# Patient Record
Sex: MALE | Race: WHITE | URBAN - METROPOLITAN AREA
[De-identification: names, ages, dates, MRNs, and addresses within clinical notes are randomized per-mention and may not be internally consistent; named-entity substitution may affect disease eponyms.]

---

## 2024-09-16 ENCOUNTER — TELEPHONE (OUTPATIENT)
Dept: HEALTH INFORMATION MANAGEMENT | Facility: OTHER | Age: 59
End: 2024-09-16

## 2025-04-05 ENCOUNTER — APPOINTMENT (OUTPATIENT)
Dept: RADIOLOGY | Facility: MEDICAL CENTER | Age: 60
End: 2025-04-05
Attending: STUDENT IN AN ORGANIZED HEALTH CARE EDUCATION/TRAINING PROGRAM
Payer: COMMERCIAL

## 2025-06-06 ENCOUNTER — APPOINTMENT (OUTPATIENT)
Dept: MEDICAL GROUP | Facility: MEDICAL CENTER | Age: 60
End: 2025-06-06
Payer: COMMERCIAL

## 2025-06-06 VITALS
BODY MASS INDEX: 35.36 KG/M2 | SYSTOLIC BLOOD PRESSURE: 110 MMHG | TEMPERATURE: 98.1 F | DIASTOLIC BLOOD PRESSURE: 66 MMHG | OXYGEN SATURATION: 95 % | HEART RATE: 69 BPM | WEIGHT: 247 LBS | HEIGHT: 70 IN

## 2025-06-06 DIAGNOSIS — Z00.00 ENCOUNTER FOR MEDICAL EXAMINATION TO ESTABLISH CARE: Primary | ICD-10-CM

## 2025-06-06 DIAGNOSIS — I83.023 VARICOSE VEINS OF LEFT LOWER EXTREMITY WITH ULCER OF ANKLE, UNSPECIFIED ULCER STAGE (HCC): ICD-10-CM

## 2025-06-06 DIAGNOSIS — Z13.29 THYROID DISORDER SCREENING: ICD-10-CM

## 2025-06-06 DIAGNOSIS — E78.5 HYPERLIPIDEMIA, UNSPECIFIED HYPERLIPIDEMIA TYPE: ICD-10-CM

## 2025-06-06 DIAGNOSIS — L97.329 VARICOSE VEINS OF LEFT LOWER EXTREMITY WITH ULCER OF ANKLE, UNSPECIFIED ULCER STAGE (HCC): ICD-10-CM

## 2025-06-06 DIAGNOSIS — Z79.01 LONG TERM CURRENT USE OF ANTICOAGULANT THERAPY: ICD-10-CM

## 2025-06-06 DIAGNOSIS — Z13.1 SCREENING FOR DIABETES MELLITUS: ICD-10-CM

## 2025-06-06 DIAGNOSIS — G89.29 CHRONIC NONINTRACTABLE HEADACHE, UNSPECIFIED HEADACHE TYPE: ICD-10-CM

## 2025-06-06 DIAGNOSIS — F51.01 PRIMARY INSOMNIA: ICD-10-CM

## 2025-06-06 DIAGNOSIS — I87.2 VENOUS INSUFFICIENCY: ICD-10-CM

## 2025-06-06 DIAGNOSIS — Z86.718 HISTORY OF THROMBOEMBOLISM OF VEIN: ICD-10-CM

## 2025-06-06 DIAGNOSIS — Z12.11 COLON CANCER SCREENING: ICD-10-CM

## 2025-06-06 DIAGNOSIS — R51.9 CHRONIC NONINTRACTABLE HEADACHE, UNSPECIFIED HEADACHE TYPE: ICD-10-CM

## 2025-06-06 DIAGNOSIS — R79.89 LOW TESTOSTERONE: ICD-10-CM

## 2025-06-06 DIAGNOSIS — Z86.711 HISTORY OF PULMONARY EMBOLISM: ICD-10-CM

## 2025-06-06 PROBLEM — I50.810 RIGHT VENTRICULAR FAILURE (HCC): Status: ACTIVE | Noted: 2025-06-06

## 2025-06-06 PROBLEM — L97.321: Status: ACTIVE | Noted: 2025-06-06

## 2025-06-06 PROBLEM — N52.9 ERECTILE DYSFUNCTION: Status: ACTIVE | Noted: 2025-06-06

## 2025-06-06 PROBLEM — K59.00 CONSTIPATION: Status: ACTIVE | Noted: 2025-06-06

## 2025-06-06 PROBLEM — F32.9 MAJOR DEPRESSIVE DISORDER, SINGLE EPISODE, UNSPECIFIED: Status: ACTIVE | Noted: 2025-06-06

## 2025-06-06 PROBLEM — F32.9 MAJOR DEPRESSIVE DISORDER, SINGLE EPISODE, UNSPECIFIED: Status: RESOLVED | Noted: 2025-06-06 | Resolved: 2025-06-06

## 2025-06-06 PROBLEM — I50.810 RIGHT VENTRICULAR FAILURE (HCC): Status: RESOLVED | Noted: 2025-06-06 | Resolved: 2025-06-06

## 2025-06-06 PROCEDURE — 3074F SYST BP LT 130 MM HG: CPT | Performed by: FAMILY MEDICINE

## 2025-06-06 PROCEDURE — 3078F DIAST BP <80 MM HG: CPT | Performed by: FAMILY MEDICINE

## 2025-06-06 PROCEDURE — 99204 OFFICE O/P NEW MOD 45 MIN: CPT | Performed by: FAMILY MEDICINE

## 2025-06-06 RX ORDER — WARFARIN SODIUM 10 MG/1
10 TABLET ORAL DAILY
Qty: 30 TABLET | Refills: 3 | Status: SHIPPED | OUTPATIENT
Start: 2025-06-06

## 2025-06-06 RX ORDER — ROSUVASTATIN CALCIUM 5 MG/1
5 TABLET, COATED ORAL DAILY
Qty: 100 TABLET | Refills: 3 | Status: SHIPPED | OUTPATIENT
Start: 2025-06-06

## 2025-06-06 RX ORDER — ENOXAPARIN SODIUM 100 MG/ML
30 INJECTION SUBCUTANEOUS EVERY 12 HOURS
Qty: 3.6 ML | Refills: 0 | Status: SHIPPED | OUTPATIENT
Start: 2025-06-06

## 2025-06-06 RX ORDER — ORPHENADRINE CITRATE 100 MG/1
1 TABLET ORAL
COMMUNITY
Start: 2025-03-18 | End: 2025-06-06

## 2025-06-06 RX ORDER — DULOXETIN HYDROCHLORIDE 30 MG/1
CAPSULE, DELAYED RELEASE ORAL
COMMUNITY
Start: 2025-04-15 | End: 2025-06-06

## 2025-06-06 RX ORDER — TRAZODONE HYDROCHLORIDE 100 MG/1
100 TABLET ORAL NIGHTLY
Qty: 90 TABLET | Refills: 3 | Status: SHIPPED | OUTPATIENT
Start: 2025-06-06

## 2025-06-06 ASSESSMENT — PATIENT HEALTH QUESTIONNAIRE - PHQ9
2. FEELING DOWN, DEPRESSED, IRRITABLE, OR HOPELESS: NOT AT ALL
SUM OF ALL RESPONSES TO PHQ9 QUESTIONS 1 AND 2: 0
7. TROUBLE CONCENTRATING ON THINGS, SUCH AS READING THE NEWSPAPER OR WATCHING TELEVISION: NOT AT ALL
1. LITTLE INTEREST OR PLEASURE IN DOING THINGS: NOT AT ALL
5. POOR APPETITE OR OVEREATING: NOT AT ALL
3. TROUBLE FALLING OR STAYING ASLEEP OR SLEEPING TOO MUCH: NOT AT ALL
8. MOVING OR SPEAKING SO SLOWLY THAT OTHER PEOPLE COULD HAVE NOTICED. OR THE OPPOSITE, BEING SO FIGETY OR RESTLESS THAT YOU HAVE BEEN MOVING AROUND A LOT MORE THAN USUAL: NOT AT ALL
SUM OF ALL RESPONSES TO PHQ QUESTIONS 1-9: 0
4. FEELING TIRED OR HAVING LITTLE ENERGY: NOT AT ALL
9. THOUGHTS THAT YOU WOULD BE BETTER OFF DEAD, OR OF HURTING YOURSELF: NOT AT ALL
6. FEELING BAD ABOUT YOURSELF - OR THAT YOU ARE A FAILURE OR HAVE LET YOURSELF OR YOUR FAMILY DOWN: NOT AL ALL

## 2025-06-06 NOTE — PROGRESS NOTES
Verbal consent was acquired by the patient to use Might[employer] ambient listening note generation during this visit: Yes      HPI:    Davis Yoo is a pleasant 59 y.o. male here to establish care.    History of Present Illness  The patient is a 59-year-old individual here to establish care.    Severe shoulder arthritis  - Diagnosed with severe shoulder arthritis, characterized by near bone-on-bone contact  - Deferred surgical intervention due to personal circumstances  - Manages significant pain without surgery    Colon cancer screening  - Prefers Cologuard over colonoscopy for colon cancer screening  - Received COVID-19 vaccine    Dental extraction assistance  - Seeking assistance with paperwork for dental extraction  - Requires guidance on managing anticoagulation therapy  - Currently on warfarin, will transition to Lovenox before the procedure    Pulmonary embolism and thromboembolism  - History of pulmonary embolism and thromboembolism, managed with Coumadin    Primary insomnia and chronic headaches  - Has primary insomnia  - Chronic headaches  - Recurring headaches improved with magnesium    Venous insufficiency and varicose veins  - Venous insufficiency  - Varicose veins    Low testosterone and constipation  - Low testosterone  - Constipation    Substance use disorder and smoking  - History of substance use disorder, including past meth use  - Currently smokes cigarettes    Family history  - Mother with COPD  - Both siblings     Past surgical history  - 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 Currently .    FAMILY HISTORY  Mother has COPD. Both siblings .    Current medicines (including changes today)  Current Medications[1]    Past Medical/ Surgical History  He  has a past medical history of DVT (deep venous thrombosis) (HCC).  He   "has a past surgical history that includes other.    Social History  Social History[2]  Social History     Social History Narrative    Not on file        Family History  Family History   Problem Relation Age of Onset    Lung Disease Mother         COPD + Smk    Psychiatric Illness Father         anger issues    Alcohol abuse Father     Kidney Disease Brother      Family Status   Relation Name Status    Mo  (Not Specified)    Fa unknown (Not Specified)    Sis      Sis      Sis 1/2 mom Alive    Bro      Bro  Alive    Bro  Alive   No partnership data on file        Objective:     /66 (BP Location: Left arm, Patient Position: Sitting, BP Cuff Size: Adult)   Pulse 69   Temp 36.7 °C (98.1 °F) (Temporal)   Ht 1.784 m (5' 10.24\")   Wt 112 kg (247 lb)   SpO2 95%  Body mass index is 35.2 kg/m².    Physical Exam  Constitutional:       General: He is not in acute distress.     Appearance: He is obese.   HENT:      Head: Normocephalic and atraumatic.      Right Ear: Tympanic membrane and external ear normal.      Left Ear: Tympanic membrane and external ear normal.      Nose: No nasal deformity.      Mouth/Throat:      Lips: Pink.      Mouth: Mucous membranes are moist.      Dentition: Gingival swelling and dental caries (Missing teeth) present.      Pharynx: Oropharynx is clear. Uvula midline. No posterior oropharyngeal erythema.   Eyes:      General: Lids are normal.      Extraocular Movements: Extraocular movements intact.      Conjunctiva/sclera: Conjunctivae normal.      Pupils: Pupils are equal, round, and reactive to light.   Neck:      Trachea: Trachea normal.   Cardiovascular:      Rate and Rhythm: Normal rate and regular rhythm.      Heart sounds: Normal heart sounds. No murmur heard.     No friction rub. No gallop.   Pulmonary:      Effort: Pulmonary effort is normal. No accessory muscle usage.      Breath sounds: Normal breath sounds. No wheezing or rales.   Abdominal:      General: " Bowel sounds are normal. There is distension (Obese).      Palpations: Abdomen is soft.      Tenderness: There is no abdominal tenderness.   Musculoskeletal:      Cervical back: Normal range of motion and neck supple.      Right lower leg: No edema.      Left lower leg: No edema.   Lymphadenopathy:      Cervical: No cervical adenopathy.   Skin:     General: Skin is warm and dry.      Findings: No rash.   Neurological:      General: No focal deficit present.      Mental Status: He is alert and oriented to person, place, and time. Mental status is at baseline.      GCS: GCS eye subscore is 4. GCS verbal subscore is 5. GCS motor subscore is 6.      Motor: No weakness.      Gait: Gait is intact.   Psychiatric:         Attention and Perception: Attention normal.         Mood and Affect: Mood and affect normal.         Speech: Speech normal.          Imaging:  No imaging reviewed    Labs:  No updated labs  Assessment and Plan:   The following treatment plan was discussed:     Assessment & Plan  1. Long-term anticoagulation therapy: Chronic.  - Currently on warfarin, prescribed at 7.5 mg, but has been on 10 mg for most of the treatment duration.  - History of DVTs and PEs.  - Recommended to obtain an INR.  - Referral to vascular medicine and Coumadin clinic placed.  - Consult pharmacy regarding Coumadin levels.    2. Hyperlipidemia: Chronic.  - Prescription for rosuvastatin 5 mg once daily sent to preferred pharmacy.  - Discussed importance of medication adherence.  - Recommended follow-up to monitor lipid levels.    3. History of pulmonary embolism and thromboembolism: Chronic.  - Referral to vascular medicine placed.  - Recommended continuation of Coumadin therapy.  - Discussed risks and benefits of anticoagulation therapy.  - Follow up with vascular medicine for ongoing management.    4. Primary insomnia: Chronic.  - Refill of trazodone 100 mg sent to preferred pharmacy.  - Discussed sleep hygiene and  non-pharmacological approaches.  - Recommended follow-up to assess effectiveness.    5. Venous insufficiency, varicose veins of left lower extremity: Chronic.  - Referral to vascular medicine placed.  - Discussed symptoms and potential complications.  - Recommended compression stockings and leg elevation.    6. Low testosterone: Chronic.  - Discussed potential symptoms and treatment options.  - Recommended follow-up to monitor levels.  - Report any new or worsening symptoms.    7. Chronic headaches: Chronic.  - Discussed potential triggers and lifestyle modifications.  - Recommended magnesium supplementation.  - Keep headache diary to track patterns and triggers.    8. Dental procedure.  - Scheduled for dental extraction.  - Needs clearance from dentistry due to Coumadin use.  - Discontinue Coumadin 5 days prior, start Lovenox 30 mg twice daily 3 days before procedure. Prescription sent to preferred pharmacy.  - Stop Lovenox 24 hours before procedure, resume warfarin 10 mg daily 12 hours post-procedure.  - Continue Lovenox injections for up to 3 days post-procedure.    9. Constipation: Chronic.  - Discussed dietary modifications, increased fiber and fluid intake.  - Recommended over-the-counter laxatives if needed.    10. Recurrent headaches: Chronic.  - Improved with magnesium.  - Discussed potential triggers and lifestyle modifications.  - Recommended continuation of magnesium supplementation.  - Keep headache diary to track patterns and triggers.    11. Ulceration on left ankle: Chronic.  - Continued ulceration, scabbed over.  - Discussed wound care, keep area clean and dry.  - Recommended follow-up to monitor healing.    Follow-up in 3 months.  Davis was seen today for establish care, pre-op exam and medication refill.    Diagnoses and all orders for this visit:    Encounter for medical examination to establish care    Long term current use of anticoagulant therapy  -     Referral to Pharmacotherapy Service  -      ANTICOAGULANT MONITORING  -     Prothrombin Time; Future  -     warfarin (COUMADIN) 10 MG Tab; Take 1 Tablet by mouth every day.  -     Referral to Vascular Medicine  -     enoxaparin (LOVENOX) 30 MG/0.3ML Solution Prefilled Syringe inj; Inject 0.3 mL under the skin every 12 hours. To be taken 3 days prior to procedure.  Stop 24 hr prior and restart after procedure for 3 days    Hyperlipidemia, unspecified hyperlipidemia type  -     Referral to Pharmacotherapy Service  -     warfarin (COUMADIN) 10 MG Tab; Take 1 Tablet by mouth every day.  -     rosuvastatin (CRESTOR) 5 MG Tab; Take 1 Tablet by mouth every day.  -     Lipid Profile; Future  -     Lipoprotein (a); Future    History of pulmonary embolism  -     warfarin (COUMADIN) 10 MG Tab; Take 1 Tablet by mouth every day.  -     Referral to Vascular Medicine    History of thromboembolism of vein  -     warfarin (COUMADIN) 10 MG Tab; Take 1 Tablet by mouth every day.  -     Referral to Vascular Medicine  -     CBC WITHOUT DIFFERENTIAL; Future    Primary insomnia  -     traZODone (DESYREL) 100 MG Tab; Take 1 Tablet by mouth every evening.    Venous insufficiency  -     Referral to Vascular Medicine    Varicose veins of left lower extremity with ulcer of ankle, unspecified ulcer stage (HCC)  -     Referral to Vascular Medicine    Low testosterone    Chronic nonintractable headache, unspecified headache type    Colon cancer screening  -     Cologuard® colon cancer screening    Screening for diabetes mellitus  -     Comp Metabolic Panel; Future  -     ESTIMATED GFR; Future  -     HEMOGLOBIN A1C; Future    Thyroid disorder screening  -     TSH+FREE T4      Followup: Return in about 3 months (around 9/6/2025) for Follow-up.      Please note that this dictation was created using voice recognition software. I have made every reasonable attempt to correct obvious errors, but I expect that there are errors of grammar and possibly content that I did not discover before  finalizing the note.                [1]   Current Outpatient Medications   Medication Sig Dispense Refill    warfarin (COUMADIN) 10 MG Tab Take 1 Tablet by mouth every day. 30 Tablet 3    traZODone (DESYREL) 100 MG Tab Take 1 Tablet by mouth every evening. 90 Tablet 3    rosuvastatin (CRESTOR) 5 MG Tab Take 1 Tablet by mouth every day. 100 Tablet 3    enoxaparin (LOVENOX) 30 MG/0.3ML Solution Prefilled Syringe inj Inject 0.3 mL under the skin every 12 hours. To be taken 3 days prior to procedure.  Stop 24 hr prior and restart after procedure for 3 days 3.6 mL 0     No current facility-administered medications for this visit.   [2]   Social History  Tobacco Use    Smoking status: Former     Current packs/day: 0.25     Average packs/day: 0.3 packs/day for 35.0 years (8.8 ttl pk-yrs)     Types: Cigarettes    Smokeless tobacco: Current     Types: Chew    Tobacco comments:     Chew tobacco    Vaping Use    Vaping status: Never Used   Substance Use Topics    Alcohol use: No     Comment: sober 14 years    Drug use: Not Currently     Types: Inhaled, Intravenous     Comment: Meth

## 2025-06-13 NOTE — Clinical Note
REFERRAL APPROVAL NOTICE         Sent on June 13, 2025                   Davis Yoo  [phone] [facility] [address]                   Dear Mr. Yoo,    After a careful review of the medical information and benefit coverage, [facility] has processed your referral. See below for additional details.    If applicable, you must be actively enrolled with your insurance for coverage of the authorized service. If you have any questions regarding your coverage, please contact your insurance directly.    REFERRAL INFORMATION   Referral #:  [id number]  Referred-To Department    Referred-By Provider:  Vascular Medicine    SHANEKA Corley   Vascular Medicine      [address]  [address]  [facility] [address]  [phone] [address] [place] [id number]  [phone]    Referral Start Date:  06/06/2025  Referral End Date:   06/06/2026             SCHEDULING  If you do not already have an appointment, please call [phone] to make an appointment.     MORE INFORMATION  If you do not already have a [employer] account, sign up at: [email or web address]  You can access your medical information, make appointments, see lab results, billing information, and more.  If you have questions regarding this referral, please contact  the [facility] Referrals department at:             [phone]. Monday - Friday 8:00AM - 5:00PM.     Sincerely,    [facility]

## 2025-06-17 ENCOUNTER — TELEPHONE (OUTPATIENT)
Dept: HEALTH INFORMATION MANAGEMENT | Facility: OTHER | Age: 60
End: 2025-06-17
Payer: COMMERCIAL

## 2025-06-20 DIAGNOSIS — Z79.01 LONG TERM CURRENT USE OF ANTICOAGULANT THERAPY: Primary | ICD-10-CM

## 2025-07-11 ENCOUNTER — ANTICOAGULATION VISIT (OUTPATIENT)
Dept: VASCULAR LAB | Facility: MEDICAL CENTER | Age: 60
End: 2025-07-11
Attending: INTERNAL MEDICINE
Payer: COMMERCIAL

## 2025-07-11 VITALS — SYSTOLIC BLOOD PRESSURE: 123 MMHG | DIASTOLIC BLOOD PRESSURE: 75 MMHG | HEART RATE: 75 BPM

## 2025-07-11 DIAGNOSIS — I26.99 PULMONARY EMBOLISM, UNSPECIFIED CHRONICITY, UNSPECIFIED PULMONARY EMBOLISM TYPE, UNSPECIFIED WHETHER ACUTE COR PULMONALE PRESENT (HCC): ICD-10-CM

## 2025-07-11 DIAGNOSIS — I82.4Z9 DEEP VEIN THROMBOSIS (DVT) OF DISTAL VEIN OF LOWER EXTREMITY, UNSPECIFIED CHRONICITY, UNSPECIFIED LATERALITY (HCC): Primary | ICD-10-CM

## 2025-07-11 PROBLEM — I82.409 DEEP VEIN THROMBOSIS (HCC): Status: ACTIVE | Noted: 2025-07-11

## 2025-07-11 LAB — INR PPP: 1.5 (ref 2–3.5)

## 2025-07-11 PROCEDURE — 85610 PROTHROMBIN TIME: CPT

## 2025-07-11 PROCEDURE — 99213 OFFICE O/P EST LOW 20 MIN: CPT
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
## 2025-07-14 ENCOUNTER — DOCUMENTATION (OUTPATIENT)
Dept: VASCULAR LAB | Facility: MEDICAL CENTER | Age: 60
End: 2025-07-14
Payer: COMMERCIAL

## 2025-07-15 NOTE — PROGRESS NOTES
Initial anticoagulation clinic note and most recent PCP note reviewed    Patient with history of VTE - circumstances a bit unclear    Await vascular medicine visit as scheduled to determine whether or not patient should stay on extended anticoagulation and if so whether change to DOAC had secondary prophylactic dose would be appropriate    Will defer any indicated age appropriate screening for occult malignancy to pcp.    Michael [facility], MD  Anticoagulation Clinic    Cc:   DARRIN Mustafa

## 2025-07-16 ENCOUNTER — OFFICE VISIT (OUTPATIENT)
Dept: VASCULAR LAB | Facility: MEDICAL CENTER | Age: 60
End: 2025-07-16
Attending: INTERNAL MEDICINE
Payer: COMMERCIAL

## 2025-07-16 DIAGNOSIS — I82.4Z9 DEEP VEIN THROMBOSIS (DVT) OF DISTAL VEIN OF LOWER EXTREMITY, UNSPECIFIED CHRONICITY, UNSPECIFIED LATERALITY (HCC): ICD-10-CM

## 2025-07-16 DIAGNOSIS — I26.99 PULMONARY EMBOLISM, UNSPECIFIED CHRONICITY, UNSPECIFIED PULMONARY EMBOLISM TYPE, UNSPECIFIED WHETHER ACUTE COR PULMONALE PRESENT (HCC): Primary | ICD-10-CM

## 2025-07-16 LAB — INR PPP: 2.3 (ref 2–3.5)

## 2025-07-16 PROCEDURE — 99213 OFFICE O/P EST LOW 20 MIN: CPT | Performed by: PHARMACIST

## 2025-07-16 PROCEDURE — 85610 PROTHROMBIN TIME: CPT | Performed by: PHARMACIST

## 2025-07-16 NOTE — PROGRESS NOTES
[facility] - Initial Visit    Davis Yoo presents for management of dyslipidemia    Referral Source: PCP    Date Referral Placed: 6-20-25    Date First Seen in Clinic: 7-16-25    PERTINENT HLD PMHX  Age at Initial Diagnosis of Dyslipidemia: Patient states this has never been identified       Baseline Lipids Prior to Treatment: only known labs shown below     Latest Reference Range & Units 09/03/11 03:50   Cholesterol,Tot 100 - 199 mg/dL 206 (H)   Triglycerides 0 - 149 mg/dL 122   HDL 40 - 59 mg/dL 42   LDL <100 mg/dL 140       History of ASCVD: None    Previously Attempted Interventions for Lipids - including outcome  Statin: none     Outcome: not applicable  Non-Statin: none  Outcome: not applicable    Secondary causes/contributors (report to medical director if present):   Endocrine/Hypothyroidism:  none reported   Liver disease: none reported   Renal disease/nephrotic syndrome:  none reported  Dietary-induced (ketogenic, lean mass hyper-responder)? no  Medications: None    CURRENT MED MGMT  Current Lipid Lowering Meds:   Statin: Rosuvastatin 5mg QD - started by PCP ~one month ago  Non-Statin: None  Supplements: None  Current adverse drug reactions/side effects? N\A  Is Adherence an Issue? Yes, he admits that he has not taken in ~one week and has difficulty remembering to dose    Any Family History Cardiovascular Disease? no      There were no vitals filed for this visit.   BMI Readings from Last 1 Encounters:   06/06/25 35.20 kg/m²      Wt Readings from Last 3 Encounters:   06/06/25 112 kg (247 lb)   02/28/25 116 kg (255 lb)   01/17/25 116 kg (255 lb)     BP Readings from Last 2 Encounters:   07/11/25 123/75   06/06/25 110/66       DATA REVIEW:  Most Recent Lipid Panel:   Lab Results   Component Value Date/Time    CHOLSTRLTOT 206 (H) 09/03/2011 03:50 AM     09/03/2011 03:50 AM    HDL 42 09/03/2011 03:50 AM    TRIGLYCERIDE 122 09/03/2011 03:50 AM     Lab Results   Component Value Date/Time  "    09/03/2011 03:50 AM      No results found for: \"LDLCALC\"   No results found for: \"LIPOPROTA\"   No results found for: \"APOB\"   No results found for: \"CRPHIGHSEN\"    Other Pertinent Blood Work:        VASCULAR IMAGING:  CAC Score (if available): N/A  CIMT/Vascular screen (if available): N/A  Other (if applicable): N/A    ASSESSMENT AND PLAN    Patient Type, check all that apply: Primary Prevention    Major ASCVD events: None    Evidence of genetic dyslipidemia (Familial Hyperlipidemia): No      ASCVD risk calculations (if applicable)  8.4%    7.5 - <20% \"intermediate risk\"     ACC/AHA Indication for Statin Therapy:  Baseline Calculated ASCVD risk >7.5%: Indication for High intensity statin     Other Significant Risk Markers:  High-risk conditions: N/A  Risk-enhancers: None  Lipoprotein(a): Ordered this visit  Most recent CAC percentile: None    Lipid Goals:  Primary: LDL-C <100 mg/dl  Secondary apoB <90 mg/dl  At goals? no    LIFESTYLE INTERVENTIONS  TOBACCO: nonsmoker  Continued complete avoidance of all tobacco products   EtOH: does not drink  Men: No more than two standard servings per day  Women: No more than one standard serving per day  PHYSICAL ACTIVITY: at least 150 min per week of moderate intensity  NUTRITION: DASH , Triglyceride-lowering diet with reduced fat calories, reduced simple carbohydrates, reduce/avoid alcohol, and Low carbohydrate (10-20% CHO)      LIPID-LOWERING MEDICATION MANAGEMENT:     Statin Therapy:   Continue Rosuvastatin 5mg    Non-Statin Meds:   EZETIMIBE: Not currently indicated  NEXLETOL/NEXLIZET (avoid simva >20, prava >40): Not currently indicated  BAS: Not currently indicated    OMEGA-3 FAs: Not currently indicated  FIBRATE:  Not currently indicated  PCSK9 mAb: Not currently indicated  LEQVIO: Not currently indicated    Patient seen today for initial visit.  Cardiovascular hx is unremarkable.  No labs in many years.  Started on rosuvastatin by PCP and referred to " pharmacotherapy and Vascular Medicine.  Tolerating medication thus far.  Instructed him to continue with current therapy and have labs done week prior to initial vascular medicine.    Recommended Supplements: None     Studies Ordered at Todays Visit: None   Blood Work To Be Obtained Prior to Next Visit: Lipid panel, CMP, Lp(a), and ApoB  Follow-Up: 5 weeks with Vascular Medicine    Salvatore Malave, PharmD, BCACP    CC:  Allison D Basta, A.P.R.N. Michael [facility], M.D.

## 2025-07-16 NOTE — PROGRESS NOTES
Anticoagulation Summary  As of 2025      INR goal:  2.0-3.0   TTR:  --   INR used for dosin.30 (2025)   Warfarin maintenance plan:  10 mg (10 mg x 1) every day   Weekly warfarin total:  70 mg   Plan last modified:  William DominguezD (2025)   Next INR check:  2025   Target end date:  Indefinite    Indications    Deep vein thrombosis (HCC) [I82.409]  Pulmonary embolism (HCC) [I26.99]                 Anticoagulation Episode Summary       INR check location:  --    Preferred lab:  --    Send INR reminders to:  --    Comments:  --                Refer to Patient Findings for HPI:  Patient Findings       Negatives:  Signs/symptoms of thrombosis, Signs/symptoms of bleeding, Laboratory test error suspected, Change in health, Change in alcohol use, Change in activity, Upcoming invasive procedure, Emergency department visit, Upcoming dental procedure, Missed doses, Extra doses, Change in medications, Change in diet/appetite, Hospital admission, Bruising, Other complaints          Clinical Outcomes       Negatives:  Major bleeding event, Thromboembolic event, Anticoagulation-related hospital admission, Anticoagulation-related ED visit, Anticoagulation-related fatality            Date Referral Placed: 25      Vitals:  There were no vitals filed for this visit.  Pt declined vitals    Verified current warfarin dosing schedule.    Medications reconciled.  Pt is not on antiplatelet therapy.      A/P   INR is therapeutic  Reason(s) for out of range INR today: N/A      Warfarin dosing recommendation: Continue regimen as listed above.    Pt educated to contact our clinic with any changes in medications or s/s of bleeding or thrombosis. Pt is aware to seek immediate medical attention for falls, head injury or deep cuts.    Request pt to return in 1 week(s). Pt agrees.    Salvatore Malave, PharmD, BCACP

## 2025-07-25 ENCOUNTER — ANTICOAGULATION VISIT (OUTPATIENT)
Dept: VASCULAR LAB | Facility: MEDICAL CENTER | Age: 60
End: 2025-07-25
Attending: INTERNAL MEDICINE
Payer: COMMERCIAL

## 2025-07-25 DIAGNOSIS — Z79.01 CHRONIC ANTICOAGULATION: Primary | ICD-10-CM

## 2025-07-25 LAB — INR PPP: 2.4 (ref 2–3.5)

## 2025-07-25 PROCEDURE — 85610 PROTHROMBIN TIME: CPT

## 2025-07-25 PROCEDURE — 99213 OFFICE O/P EST LOW 20 MIN: CPT

## 2025-07-25 NOTE — PROGRESS NOTES
Anticoagulation Summary  As of 2025      INR goal:  2.0-3.0   TTR:  100.0% (4 d)   INR used for dosin.40 (2025)   Warfarin maintenance plan:  10 mg (10 mg x 1) every day   Weekly warfarin total:  70 mg   Plan last modified:  Ivette Ching PharmD (2025)   Next INR check:  2025   Target end date:  Indefinite    Indications    Deep vein thrombosis (HCC) [I82.409]  Pulmonary embolism (HCC) [I26.99]                 Anticoagulation Episode Summary       INR check location:  --    Preferred lab:  --    Send INR reminders to:  --    Comments:  --                Refer to Patient Findings for HPI:  Patient Findings       Negatives:  Signs/symptoms of thrombosis, Signs/symptoms of bleeding, Laboratory test error suspected, Change in health, Change in alcohol use, Change in activity, Upcoming invasive procedure, Emergency department visit, Upcoming dental procedure, Missed doses, Extra doses, Change in medications, Change in diet/appetite, Hospital admission, Bruising, Other complaints          Clinical Outcomes       Negatives:  Major bleeding event, Thromboembolic event, Anticoagulation-related hospital admission, Anticoagulation-related ED visit, Anticoagulation-related fatality            Date Referral Placed: 25       Vitals:  There were no vitals filed for this visit.  Pt declined vitals    Verified current warfarin dosing schedule.    Medications reconciled.  Pt is not on antiplatelet therapy.      A/P   INR is therapeutic  Reason(s) for out of range INR today: N/A        Warfarin dosing recommendation: Continue regimen as listed above.    Pt educated to contact our clinic with any changes in medications or s/s of bleeding or thrombosis. Pt is aware to seek immediate medical attention for falls, head injury or deep cuts.    Request pt to return in 2 week(s). Pt agrees.    Sushant Skaggs, WilliamD

## 2025-08-08 ENCOUNTER — ANTICOAGULATION VISIT (OUTPATIENT)
Dept: VASCULAR LAB | Facility: MEDICAL CENTER | Age: 60
End: 2025-08-08
Attending: INTERNAL MEDICINE
Payer: COMMERCIAL

## 2025-08-08 DIAGNOSIS — I82.4Z9 DEEP VEIN THROMBOSIS (DVT) OF DISTAL VEIN OF LOWER EXTREMITY, UNSPECIFIED CHRONICITY, UNSPECIFIED LATERALITY (HCC): Primary | ICD-10-CM

## 2025-08-08 DIAGNOSIS — I26.99 PULMONARY EMBOLISM, UNSPECIFIED CHRONICITY, UNSPECIFIED PULMONARY EMBOLISM TYPE, UNSPECIFIED WHETHER ACUTE COR PULMONALE PRESENT (HCC): ICD-10-CM

## 2025-08-08 LAB — INR PPP: 2.7 (ref 2–3.5)

## 2025-08-08 PROCEDURE — 99213 OFFICE O/P EST LOW 20 MIN: CPT

## 2025-08-08 PROCEDURE — 85610 PROTHROMBIN TIME: CPT

## 2025-08-22 ENCOUNTER — TELEPHONE (OUTPATIENT)
Dept: HEALTH INFORMATION MANAGEMENT | Facility: OTHER | Age: 60
End: 2025-08-22
Payer: COMMERCIAL

## 2025-08-29 ENCOUNTER — ANTICOAGULATION VISIT (OUTPATIENT)
Dept: VASCULAR LAB | Facility: MEDICAL CENTER | Age: 60
End: 2025-08-29
Attending: INTERNAL MEDICINE
Payer: COMMERCIAL

## 2025-08-29 DIAGNOSIS — I82.4Z9 DEEP VEIN THROMBOSIS (DVT) OF DISTAL VEIN OF LOWER EXTREMITY, UNSPECIFIED CHRONICITY, UNSPECIFIED LATERALITY (HCC): Primary | ICD-10-CM

## 2025-08-29 DIAGNOSIS — I26.99 PULMONARY EMBOLISM, UNSPECIFIED CHRONICITY, UNSPECIFIED PULMONARY EMBOLISM TYPE, UNSPECIFIED WHETHER ACUTE COR PULMONALE PRESENT (HCC): ICD-10-CM

## 2025-08-29 LAB — INR PPP: 2.2 (ref 2–3.5)

## 2025-08-29 PROCEDURE — 85610 PROTHROMBIN TIME: CPT

## 2025-08-29 PROCEDURE — 99213 OFFICE O/P EST LOW 20 MIN: CPT
